# Patient Record
Sex: FEMALE | Race: WHITE
[De-identification: names, ages, dates, MRNs, and addresses within clinical notes are randomized per-mention and may not be internally consistent; named-entity substitution may affect disease eponyms.]

---

## 2017-06-17 ENCOUNTER — HOSPITAL ENCOUNTER (OUTPATIENT)
Dept: HOSPITAL 47 - LABWHC1 | Age: 57
Discharge: HOME | End: 2017-06-17
Attending: INTERNAL MEDICINE
Payer: COMMERCIAL

## 2017-06-17 DIAGNOSIS — Z00.00: Primary | ICD-10-CM

## 2017-06-17 DIAGNOSIS — Z13.220: ICD-10-CM

## 2017-06-17 LAB
ALP SERPL-CCNC: 107 U/L (ref 38–126)
ALT SERPL-CCNC: 32 U/L (ref 9–52)
ANION GAP SERPL CALC-SCNC: 10 MMOL/L
AST SERPL-CCNC: 25 U/L (ref 14–36)
BASOPHILS # BLD AUTO: 0 K/UL (ref 0–0.2)
BASOPHILS NFR BLD AUTO: 1 %
BUN SERPL-SCNC: 19 MG/DL (ref 7–17)
CALCIUM SPEC-MCNC: 9.2 MG/DL (ref 8.4–10.2)
CH: 30
CHCM: 33.6
CHLORIDE SERPL-SCNC: 107 MMOL/L (ref 98–107)
CHOLEST SERPL-MCNC: 209 MG/DL (ref ?–200)
CO2 SERPL-SCNC: 26 MMOL/L (ref 22–30)
EOSINOPHIL # BLD AUTO: 0.1 K/UL (ref 0–0.7)
EOSINOPHIL NFR BLD AUTO: 2 %
ERYTHROCYTE [DISTWIDTH] IN BLOOD BY AUTOMATED COUNT: 4.37 M/UL (ref 3.8–5.4)
ERYTHROCYTE [DISTWIDTH] IN BLOOD: 13.2 % (ref 11.5–15.5)
GLUCOSE SERPL-MCNC: 79 MG/DL (ref 74–99)
HCT VFR BLD AUTO: 39.2 % (ref 34–46)
HDLC SERPL-MCNC: 64 MG/DL (ref 40–60)
HDW: 2.73
HGB BLD-MCNC: 13.2 GM/DL (ref 11.4–16)
LUC NFR BLD AUTO: 2 %
LYMPHOCYTES # SPEC AUTO: 1.5 K/UL (ref 1–4.8)
LYMPHOCYTES NFR SPEC AUTO: 29 %
MCH RBC QN AUTO: 30.2 PG (ref 25–35)
MCHC RBC AUTO-ENTMCNC: 33.5 G/DL (ref 31–37)
MCV RBC AUTO: 89.9 FL (ref 80–100)
MONOCYTES # BLD AUTO: 0.2 K/UL (ref 0–1)
MONOCYTES NFR BLD AUTO: 4 %
NEUTROPHILS # BLD AUTO: 3.4 K/UL (ref 1.3–7.7)
NEUTROPHILS NFR BLD AUTO: 63 %
NON-AFRICAN AMERICAN GFR(MDRD): >60
POTASSIUM SERPL-SCNC: 4.1 MMOL/L (ref 3.5–5.1)
PROT SERPL-MCNC: 7.1 G/DL (ref 6.3–8.2)
SODIUM SERPL-SCNC: 143 MMOL/L (ref 137–145)
TRIGL SERPL-MCNC: 51 MG/DL (ref ?–150)
WBC # BLD AUTO: 0.12 10*3/UL
WBC # BLD AUTO: 5.4 K/UL (ref 3.8–10.6)
WBC (PEROX): 5.53

## 2017-06-17 PROCEDURE — 36415 COLL VENOUS BLD VENIPUNCTURE: CPT

## 2017-06-17 PROCEDURE — 80061 LIPID PANEL: CPT

## 2017-06-17 PROCEDURE — 80053 COMPREHEN METABOLIC PANEL: CPT

## 2017-06-17 PROCEDURE — 85025 COMPLETE CBC W/AUTO DIFF WBC: CPT

## 2017-06-26 ENCOUNTER — HOSPITAL ENCOUNTER (OUTPATIENT)
Dept: HOSPITAL 47 - ORWHC2ENDO | Age: 57
Discharge: HOME | End: 2017-06-26
Payer: COMMERCIAL

## 2017-06-26 VITALS — SYSTOLIC BLOOD PRESSURE: 136 MMHG | DIASTOLIC BLOOD PRESSURE: 75 MMHG | HEART RATE: 70 BPM

## 2017-06-26 VITALS — BODY MASS INDEX: 35.2 KG/M2

## 2017-06-26 VITALS — TEMPERATURE: 98.1 F | RESPIRATION RATE: 16 BRPM

## 2017-06-26 DIAGNOSIS — Z79.1: ICD-10-CM

## 2017-06-26 DIAGNOSIS — Z79.899: ICD-10-CM

## 2017-06-26 DIAGNOSIS — Z88.5: ICD-10-CM

## 2017-06-26 DIAGNOSIS — I10: ICD-10-CM

## 2017-06-26 DIAGNOSIS — Z12.11: Primary | ICD-10-CM

## 2017-06-26 DIAGNOSIS — K21.9: ICD-10-CM

## 2017-06-26 DIAGNOSIS — Z79.82: ICD-10-CM

## 2017-06-26 DIAGNOSIS — Z88.2: ICD-10-CM

## 2017-06-26 DIAGNOSIS — Z91.040: ICD-10-CM

## 2017-06-26 NOTE — P.PCN
Date of Procedure: 06/26/17


Preoperative Diagnosis: 





Postoperative Diagnosis: 





Procedure(s) Performed: 


Procedure: Total colonoscopy.





Preoperative diagnosis: Screening for neoplasia.





Postoperative diagnosis: Exam within normal limits.





Preparation: HalfLytely prep.





Sedation: Was provided by anesthesia.





Brief clinical history: The patient is a 56-year-old female who is referred for 

this evaluation for screening for neoplasia age being her risk factor.  She has 

no abdominal complaints, bleeding or anemia.  Her father had polyps but there 

is no family history of colon cancer.  This would be her first colonoscopy.





Procedure: With the patient on her left lateral decubitus position and after 

informed consent and adequate sedation, the perianal area was inspected and it 

did not show any fissures or fistulas.  There were no masses felt on digital 

rectal examination.  The Olympus CFQ 160L video colonoscope was then inserted 

in the rectum in the usual fashion and advanced to the cecum.  The mucosa 

appeared healthy.  No polyps or tumors were seen or any obvious diverticular 

disease or other pathology.  I retroflexed the endoscope in the rectum before 

the endoscope was withdrawn.





The patient tolerated the procedure well.





Plan: The patient was reassured.  In the absence of family history of colon 

cancer, and not finding any polyps on this exam today, I recommended repeat 

colonoscopy in 10 years.  She will follow-up with you as planned.


Implants: 





Indications for Procedure: 





Operative Findings: 





Description of Procedure:

## 2017-07-03 ENCOUNTER — HOSPITAL ENCOUNTER (OUTPATIENT)
Dept: HOSPITAL 47 - RADMAMWWP | Age: 57
Discharge: HOME | End: 2017-07-03
Attending: INTERNAL MEDICINE
Payer: COMMERCIAL

## 2017-07-03 DIAGNOSIS — Z12.31: Primary | ICD-10-CM

## 2017-07-05 NOTE — MM
Reason for exam: screening  (asymptomatic).

Last mammogram was performed 2 years and 3 months ago.



History:

Patient is postmenopausal.

Took hormonal contraceptives for 3 years 6 months.



Physical Findings:

A clinical breast exam by your physician is recommended on an annual basis and 

results should be correlated with mammographic findings.



MG Screening Mammo w CAD

Bilateral CC and MLO view(s) were taken.

Prior study comparison: April 3, 2015, bilateral MG screening mammo w CAD.

The breast tissue is heterogeneously dense. This may lower the sensitivity of 

mammography.

Finding: There is equal, spiculated, architectural distortion in both breasts, in 

the right breast middle CC view 5cm from the nipple and in the left breast upper 

MLO view 5 cm from the nipple.

Findings are changed since April 3, 2015.





ASSESSMENT: Incomplete: need additional imaging evaluation, BI-RAD 0



RECOMMENDATION:

Special view mammogram of both breasts.



If lesion persists on supplemental views, image directed ultrasound is 

recommended.



[facility] will attempt to contact patient to return for supplemental 

views and ultrasound if indicated.

## 2017-08-21 ENCOUNTER — HOSPITAL ENCOUNTER (OUTPATIENT)
Dept: HOSPITAL 47 - RADMAMWWP | Age: 57
Discharge: HOME | End: 2017-08-21
Attending: INTERNAL MEDICINE
Payer: COMMERCIAL

## 2017-08-21 DIAGNOSIS — R92.8: Primary | ICD-10-CM

## 2017-08-22 NOTE — MM
Reason for exam: additional evaluation requested from abnormal screening.

Last mammogram was performed 2 months ago.



History:

Patient is postmenopausal.

Took hormonal contraceptives for 3 years 6 months.



Physical Findings:

Nurse did not find any significant physical abnormalities on exam.



MG Work Up Mamm w CAD BILAT

Bilateral CC and MLO view(s) were taken.

Prior study comparison: July 3, 2017, bilateral MG screening mammo w CAD.  April 

3, 2015, bilateral MG screening mammo w CAD.

There are scattered fibroglandular densities.

Finding: There are typically benign calcifications in the right breast.



These results were verbally communicated with the patient and result sheet given 

to the patient on 8/21/17.





ASSESSMENT: Probably benign, BI-RAD 3



RECOMMENDATION:

Follow-up diagnostic mammogram of both breasts in 6 months.

## 2018-03-04 ENCOUNTER — HOSPITAL ENCOUNTER (EMERGENCY)
Dept: HOSPITAL 47 - EC | Age: 58
Discharge: HOME | End: 2018-03-04
Payer: COMMERCIAL

## 2018-03-04 VITALS — DIASTOLIC BLOOD PRESSURE: 75 MMHG | RESPIRATION RATE: 18 BRPM | SYSTOLIC BLOOD PRESSURE: 166 MMHG | HEART RATE: 77 BPM

## 2018-03-04 VITALS — TEMPERATURE: 97.4 F

## 2018-03-04 DIAGNOSIS — Z88.2: ICD-10-CM

## 2018-03-04 DIAGNOSIS — Z79.1: ICD-10-CM

## 2018-03-04 DIAGNOSIS — Z53.20: ICD-10-CM

## 2018-03-04 DIAGNOSIS — R10.84: ICD-10-CM

## 2018-03-04 DIAGNOSIS — R11.2: ICD-10-CM

## 2018-03-04 DIAGNOSIS — Z88.5: ICD-10-CM

## 2018-03-04 DIAGNOSIS — Z91.040: ICD-10-CM

## 2018-03-04 DIAGNOSIS — Z79.899: ICD-10-CM

## 2018-03-04 DIAGNOSIS — Z79.82: ICD-10-CM

## 2018-03-04 DIAGNOSIS — K92.1: Primary | ICD-10-CM

## 2018-03-04 LAB
ALBUMIN SERPL-MCNC: 4.1 G/DL (ref 3.5–5)
ALP SERPL-CCNC: 119 U/L (ref 38–126)
ALT SERPL-CCNC: 31 U/L (ref 9–52)
ANION GAP SERPL CALC-SCNC: 8 MMOL/L
AST SERPL-CCNC: 29 U/L (ref 14–36)
BASOPHILS # BLD AUTO: 0 K/UL (ref 0–0.2)
BASOPHILS NFR BLD AUTO: 0 %
BUN SERPL-SCNC: 19 MG/DL (ref 7–17)
CALCIUM SPEC-MCNC: 9.7 MG/DL (ref 8.4–10.2)
CHLORIDE SERPL-SCNC: 106 MMOL/L (ref 98–107)
CK SERPL-CCNC: 156 U/L (ref 30–135)
CO2 SERPL-SCNC: 29 MMOL/L (ref 22–30)
EOSINOPHIL # BLD AUTO: 0.1 K/UL (ref 0–0.7)
EOSINOPHIL NFR BLD AUTO: 1 %
ERYTHROCYTE [DISTWIDTH] IN BLOOD BY AUTOMATED COUNT: 4.67 M/UL (ref 3.8–5.4)
ERYTHROCYTE [DISTWIDTH] IN BLOOD: 13.3 % (ref 11.5–15.5)
GLUCOSE SERPL-MCNC: 95 MG/DL (ref 74–99)
HCT VFR BLD AUTO: 41.4 % (ref 34–46)
HGB BLD-MCNC: 13.6 GM/DL (ref 11.4–16)
LIPASE SERPL-CCNC: 65 U/L (ref 23–300)
LYMPHOCYTES # SPEC AUTO: 1 K/UL (ref 1–4.8)
LYMPHOCYTES NFR SPEC AUTO: 10 %
MCH RBC QN AUTO: 29.1 PG (ref 25–35)
MCHC RBC AUTO-ENTMCNC: 32.8 G/DL (ref 31–37)
MCV RBC AUTO: 88.7 FL (ref 80–100)
MONOCYTES # BLD AUTO: 0.4 K/UL (ref 0–1)
MONOCYTES NFR BLD AUTO: 4 %
NEUTROPHILS # BLD AUTO: 8.3 K/UL (ref 1.3–7.7)
NEUTROPHILS NFR BLD AUTO: 84 %
PLATELET # BLD AUTO: 207 K/UL (ref 150–450)
POTASSIUM SERPL-SCNC: 4.3 MMOL/L (ref 3.5–5.1)
PROT SERPL-MCNC: 7.2 G/DL (ref 6.3–8.2)
SODIUM SERPL-SCNC: 143 MMOL/L (ref 137–145)
TROPONIN I SERPL-MCNC: <0.012 NG/ML (ref 0–0.03)
WBC # BLD AUTO: 9.9 K/UL (ref 3.8–10.6)

## 2018-03-04 PROCEDURE — 74022 RADEX COMPL AQT ABD SERIES: CPT

## 2018-03-04 PROCEDURE — 36415 COLL VENOUS BLD VENIPUNCTURE: CPT

## 2018-03-04 PROCEDURE — 84484 ASSAY OF TROPONIN QUANT: CPT

## 2018-03-04 PROCEDURE — 99285 EMERGENCY DEPT VISIT HI MDM: CPT

## 2018-03-04 PROCEDURE — 96361 HYDRATE IV INFUSION ADD-ON: CPT

## 2018-03-04 PROCEDURE — 82272 OCCULT BLD FECES 1-3 TESTS: CPT

## 2018-03-04 PROCEDURE — 96374 THER/PROPH/DIAG INJ IV PUSH: CPT

## 2018-03-04 PROCEDURE — 83605 ASSAY OF LACTIC ACID: CPT

## 2018-03-04 PROCEDURE — 85025 COMPLETE CBC W/AUTO DIFF WBC: CPT

## 2018-03-04 PROCEDURE — 83690 ASSAY OF LIPASE: CPT

## 2018-03-04 PROCEDURE — 82553 CREATINE MB FRACTION: CPT

## 2018-03-04 PROCEDURE — 80053 COMPREHEN METABOLIC PANEL: CPT

## 2018-03-04 PROCEDURE — 82550 ASSAY OF CK (CPK): CPT

## 2018-03-04 PROCEDURE — 85730 THROMBOPLASTIN TIME PARTIAL: CPT

## 2018-03-04 RX ADMIN — ONDANSETRON STA: 2 INJECTION INTRAMUSCULAR; INTRAVENOUS at 13:58

## 2018-03-04 RX ADMIN — ONDANSETRON STA MG: 2 INJECTION INTRAMUSCULAR; INTRAVENOUS at 13:57

## 2018-03-04 NOTE — ED
GI Bleed HPI





- General


Chief complaint: GI Bleed


Stated complaint: Blood in stool, abd pain


Time Seen by Provider: 03/04/18 13:16


Source: patient


Mode of arrival: ambulatory


Limitations: no limitations





- History of Present Illness


Initial comments: 


57 years O female had nausea vomiting and diarrhea since 12 midnight she ate a 

ate at a Chinese restaurant at supper then she had some strawberries at home 

she been feeling cramps in the abdomen and now she moved her bowels she said 

she lost control and was between around 20 times she went to the urgent care 

they noticed that she had some blood in the stool they referred her here she 

has no history of GI bleeds in the past she has no surgeries on her abdomen she 

still have her appendix still has her gallbladder, she had a colonoscopy done 

in June lost.  It was unremarkable and no obvious history of hemorrhoids and she

's not any blood thinners is complaining about generalized mild pain in the 

abdomen system is unremarkable otherwise








- Related Data


 Home Medications











 Medication  Instructions  Recorded  Confirmed


 


Aspirin 81 mg PO HS 06/21/17 03/04/18


 


Cyclobenzaprine [Flexeril] 10 mg PO HS PRN 06/21/17 03/04/18


 


Lisinopril [Zestril] 2.5 mg PO HS 06/21/17 03/04/18


 


Naproxen Sodium [Aleve] 220 mg PO DAILY 06/21/17 03/04/18


 


Omeprazole [PriLOSEC] 20 mg PO DAILY PRN 06/21/17 03/04/18


 


diphenhydrAMINE [Benadryl] 50 mg PO BID PRN 06/21/17 03/04/18








 Previous Rx's











 Medication  Instructions  Recorded


 


Ciprofloxacin HCl [Cipro] 500 mg PO Q12HR #14 tablet 03/04/18











 Allergies











Allergy/AdvReac Type Severity Reaction Status Date / Time


 


codeine Allergy  elevated BP Verified 03/04/18 14:00


 


Latex, Natural Rubber Allergy  red skin Verified 03/04/18 14:00


 


Sulfa (Sulfonamide Allergy  Rash/Hives Verified 03/04/18 14:00





Antibiotics)     














Review of Systems


ROS Statement: 


Those systems with pertinent positive or pertinent negative responses have been 

documented in the HPI.





ROS Other: All systems not noted in ROS Statement are negative.





Past Medical History


Past Medical History: No Reported History


History of Any Multi-Drug Resistant Organisms: None Reported


Past Surgical History: Orthopedic Surgery


Past Psychological History: No Psychological Hx Reported


Smoking Status: Never smoker


Past Alcohol Use History: None Reported


Past Drug Use History: None Reported





General Exam





- General Exam Comments


Initial Comments: 


General:  The patient is awake and alert, in no distress, and does not appear 

acutely ill. 


Skin:  Skin is warm and dry and no rashes or lesions are noted. 


Eye:  Pupils are equal, round and reactive to light, extra-ocular movements are 

intact; there is normal conjunctiva bilaterally.  


Ears, nose, mouth and throat:  There are moist mucous membranes and no oral 

lesions. 


Neck:  The neck is supple, there is no tenderness  or JVD.  


Cardiovascular:  There is a regular rate and rhythm. No murmur, rub or gallop 

is appreciated.


Respiratory: To auscultation bilateral, no wheezing no rhonchi no distress  

respiratory wise noticed


Gastrointestinal:  Soft, non-distended, non-tender abdomen without masses or 

organomegaly noted. There is no rebound or guarding present. Bowel sounds are 

unremarkable. 


Back:  There is no tenderness to palpation in the midline. There is no obvious 

deformity.


Musculoskeletal:  Normal ROM, no tenderness, There is no pedal edema. There is 

no calf tenderness or swelling. No cords were appreciated.  


Neurological:  CN II-XII intact, Cranial nerves III through XII are intact. 

There are no obvious motor or sensory deficits. Coordination appears grossly 

intact. Speech is normal.


Psychiatric:  Cooperative, appropriate mood & affect, normal judgment.  








Limitations: no limitations





Course


 Vital Signs











  03/04/18 03/04/18





  12:42 15:07


 


Temperature 98.3 F 


 


Pulse Rate 76 77


 


Respiratory 20 18





Rate  


 


Blood Pressure 189/86 166/75


 


O2 Sat by Pulse 100 99





Oximetry  








Labs were reviewed, hemoglobin today is 13.6 back in 2015 it was the same 

ballpark it was 13.6 no significant drop in hemoglobin today anesthesia not 

revealing any significant dehydration considering blood in the stool and will 

cover her for E. coli blood cultures be sent if necessary over and over and 

parasite be done later since she ate at At a buffet where there was a bunch of 

uncooked foods I will put her on the Cipro 500 mg 1 tablet now and now and 

twice a day for next 7 days if she is able to give us a stool sample may be 

definitely sent for stool cultures to evaluate her for E. coli, salmonella, 

Shigella or Campylobacter





Medical Decision Making





- Lab Data


Result diagrams: 


 03/04/18 13:18





 03/04/18 13:18


 Lab Results











  03/04/18 03/04/18 03/04/18 Range/Units





  13:18 13:18 13:18 


 


WBC   9.9   (3.8-10.6)  k/uL


 


RBC   4.67   (3.80-5.40)  m/uL


 


Hgb   13.6   (11.4-16.0)  gm/dL


 


Hct   41.4   (34.0-46.0)  %


 


MCV   88.7   (80.0-100.0)  fL


 


MCH   29.1   (25.0-35.0)  pg


 


MCHC   32.8   (31.0-37.0)  g/dL


 


RDW   13.3   (11.5-15.5)  %


 


Plt Count   207   (150-450)  k/uL


 


Neutrophils %   84   %


 


Lymphocytes %   10   %


 


Monocytes %   4   %


 


Eosinophils %   1   %


 


Basophils %   0   %


 


Neutrophils #   8.3 H   (1.3-7.7)  k/uL


 


Lymphocytes #   1.0   (1.0-4.8)  k/uL


 


Monocytes #   0.4   (0-1.0)  k/uL


 


Eosinophils #   0.1   (0-0.7)  k/uL


 


Basophils #   0.0   (0-0.2)  k/uL


 


APTT     (22.0-30.0)  sec


 


Sodium    143  (137-145)  mmol/L


 


Potassium    4.3  (3.5-5.1)  mmol/L


 


Chloride    106  ()  mmol/L


 


Carbon Dioxide    29  (22-30)  mmol/L


 


Anion Gap    8  mmol/L


 


BUN    19 H  (7-17)  mg/dL


 


Creatinine    0.60  (0.52-1.04)  mg/dL


 


Est GFR (MDRD) Af Amer    >60  (>60 ml/min/1.73 sqM)  


 


Est GFR (MDRD) Non-Af    >60  (>60 ml/min/1.73 sqM)  


 


Glucose    95  (74-99)  mg/dL


 


Plasma Lactic Acid Brendan     (0.7-2.0)  mmol/L


 


Calcium    9.7  (8.4-10.2)  mg/dL


 


Total Bilirubin    0.8  (0.2-1.3)  mg/dL


 


AST    29  (14-36)  U/L


 


ALT    31  (9-52)  U/L


 


Alkaline Phosphatase    119  ()  U/L


 


Total Creatine Kinase  156 H    ()  U/L


 


CK-MB (CK-2)  1.9    (0.0-2.4)  ng/mL


 


CK-MB (CK-2) Rel Index  1.2    


 


Troponin I  <0.012    (0.000-0.034)  ng/mL


 


Total Protein    7.2  (6.3-8.2)  g/dL


 


Albumin    4.1  (3.5-5.0)  g/dL


 


Lipase    65  ()  U/L


 


Stool Occult Blood     (Negative)  














  03/04/18 03/04/18 03/04/18 Range/Units





  13:18 13:55 13:55 


 


WBC     (3.8-10.6)  k/uL


 


RBC     (3.80-5.40)  m/uL


 


Hgb     (11.4-16.0)  gm/dL


 


Hct     (34.0-46.0)  %


 


MCV     (80.0-100.0)  fL


 


MCH     (25.0-35.0)  pg


 


MCHC     (31.0-37.0)  g/dL


 


RDW     (11.5-15.5)  %


 


Plt Count     (150-450)  k/uL


 


Neutrophils %     %


 


Lymphocytes %     %


 


Monocytes %     %


 


Eosinophils %     %


 


Basophils %     %


 


Neutrophils #     (1.3-7.7)  k/uL


 


Lymphocytes #     (1.0-4.8)  k/uL


 


Monocytes #     (0-1.0)  k/uL


 


Eosinophils #     (0-0.7)  k/uL


 


Basophils #     (0-0.2)  k/uL


 


APTT  22.1    (22.0-30.0)  sec


 


Sodium     (137-145)  mmol/L


 


Potassium     (3.5-5.1)  mmol/L


 


Chloride     ()  mmol/L


 


Carbon Dioxide     (22-30)  mmol/L


 


Anion Gap     mmol/L


 


BUN     (7-17)  mg/dL


 


Creatinine     (0.52-1.04)  mg/dL


 


Est GFR (MDRD) Af Amer     (>60 ml/min/1.73 sqM)  


 


Est GFR (MDRD) Non-Af     (>60 ml/min/1.73 sqM)  


 


Glucose     (74-99)  mg/dL


 


Plasma Lactic Acid Brendan   1.1   (0.7-2.0)  mmol/L


 


Calcium     (8.4-10.2)  mg/dL


 


Total Bilirubin     (0.2-1.3)  mg/dL


 


AST     (14-36)  U/L


 


ALT     (9-52)  U/L


 


Alkaline Phosphatase     ()  U/L


 


Total Creatine Kinase     ()  U/L


 


CK-MB (CK-2)     (0.0-2.4)  ng/mL


 


CK-MB (CK-2) Rel Index     


 


Troponin I     (0.000-0.034)  ng/mL


 


Total Protein     (6.3-8.2)  g/dL


 


Albumin     (3.5-5.0)  g/dL


 


Lipase     ()  U/L


 


Stool Occult Blood    Positive H  (Negative)  














Disposition


Clinical Impression: 


 GI bleed, Bloody diarrhea





Disposition: HOME SELF-CARE


Condition: Good


Instructions:  Gastrointestinal Bleeding (ED)


Additional Instructions: 


She is advised to come back to the ER if she develops high fever chills 

abdominal cramps gets severe or bleeding gets severe.  And if symptoms subside 

she be referred to Dr. Ruiz for scope


Prescriptions: 


Ciprofloxacin HCl [Cipro] 500 mg PO Q12HR #14 tablet


Referrals: 


Rommel Lassiter MD [Primary Care Provider] - 1-2 days


Tomi Hernandez MD [STAFF PHYSICIAN] - 1-2 days

## 2018-03-04 NOTE — XR
EXAMINATION TYPE: XR abdomen acute w cxr

 

DATE OF EXAM: 3/4/2018

 

COMPARISON: NONE

 

HISTORY: Diarrhea. Pain.

 

TECHNIQUE:  4 views

 

FINDINGS:  

 

Heart and mediastinum are within normal limits. Lungs are clear. There is no pleural effusion.

 

Bowel gas pattern is normal. There is no sign of intestinal obstruction or pneumoperitoneum. There is
 a plate fixing old fractures of the anterior pelvis. There is a screw fixing the right sacroiliac ashli
int. There is no evidence of a mass. There are no pathologic calcifications over the kidneys. There a
re phleboliths in the pelvis. Fecal pattern appears normal.

IMPRESSION: 

Nonacute abdomen. Normal chest.

## 2018-10-27 ENCOUNTER — HOSPITAL ENCOUNTER (OUTPATIENT)
Dept: HOSPITAL 47 - LABWHC1 | Age: 58
Discharge: HOME | End: 2018-10-27
Attending: NURSE PRACTITIONER
Payer: COMMERCIAL

## 2018-10-27 DIAGNOSIS — Z13.220: ICD-10-CM

## 2018-10-27 DIAGNOSIS — Z00.00: Primary | ICD-10-CM

## 2018-10-27 LAB
ALBUMIN SERPL-MCNC: 4.3 G/DL (ref 3.8–4.9)
ALBUMIN/GLOB SERPL: 1.79 G/DL (ref 1.2–2.1)
ALP SERPL-CCNC: 137 U/L (ref 41–126)
ALT SERPL-CCNC: 22 U/L (ref 8–44)
ANION GAP SERPL CALC-SCNC: 8.6 MMOL/L (ref 4–12)
AST SERPL-CCNC: 25 U/L (ref 13–35)
BASOPHILS # BLD AUTO: 0 K/UL (ref 0–0.2)
BASOPHILS NFR BLD AUTO: 1 %
BUN SERPL-SCNC: 18 MG/DL (ref 9–27)
CALCIUM SPEC-MCNC: 9.5 MG/DL (ref 8.7–10.3)
CHLORIDE SERPL-SCNC: 104 MMOL/L (ref 96–109)
CHOLEST SERPL-MCNC: 194 MG/DL (ref 0–200)
CO2 SERPL-SCNC: 28.4 MMOL/L (ref 21.6–31.8)
EOSINOPHIL # BLD AUTO: 0.1 K/UL (ref 0–0.7)
EOSINOPHIL NFR BLD AUTO: 2 %
ERYTHROCYTE [DISTWIDTH] IN BLOOD BY AUTOMATED COUNT: 4.61 M/UL (ref 3.8–5.4)
ERYTHROCYTE [DISTWIDTH] IN BLOOD: 13.7 % (ref 11.5–15.5)
GLOBULIN SER CALC-MCNC: 2.4 G/DL (ref 2.1–3.7)
GLUCOSE SERPL-MCNC: 75 MG/DL (ref 70–110)
HCT VFR BLD AUTO: 41.7 % (ref 34–46)
HDLC SERPL-MCNC: 58 MG/DL (ref 40–60)
HGB BLD-MCNC: 13.2 GM/DL (ref 11.4–16)
LDLC SERPL CALC-MCNC: 123.2 MG/DL (ref 0–131)
LYMPHOCYTES # SPEC AUTO: 1.7 K/UL (ref 1–4.8)
LYMPHOCYTES NFR SPEC AUTO: 31 %
MCH RBC QN AUTO: 28.7 PG (ref 25–35)
MCHC RBC AUTO-ENTMCNC: 31.8 G/DL (ref 31–37)
MCV RBC AUTO: 90.4 FL (ref 80–100)
MONOCYTES # BLD AUTO: 0.3 K/UL (ref 0–1)
MONOCYTES NFR BLD AUTO: 5 %
NEUTROPHILS # BLD AUTO: 3.3 K/UL (ref 1.3–7.7)
NEUTROPHILS NFR BLD AUTO: 61 %
PLATELET # BLD AUTO: 226 K/UL (ref 150–450)
POTASSIUM SERPL-SCNC: 4.5 MMOL/L (ref 3.5–5.5)
PROT SERPL-MCNC: 6.7 G/DL (ref 6.2–8.2)
SODIUM SERPL-SCNC: 141 MMOL/L (ref 135–145)
TRIGL SERPL-MCNC: 64 MG/DL (ref 0–149)
VLDLC SERPL CALC-MCNC: 12.8 MG/DL (ref 5–40)
WBC # BLD AUTO: 5.5 K/UL (ref 3.8–10.6)

## 2018-10-27 PROCEDURE — 80061 LIPID PANEL: CPT

## 2018-10-27 PROCEDURE — 85025 COMPLETE CBC W/AUTO DIFF WBC: CPT

## 2018-10-27 PROCEDURE — 80053 COMPREHEN METABOLIC PANEL: CPT

## 2018-10-27 PROCEDURE — 36415 COLL VENOUS BLD VENIPUNCTURE: CPT

## 2019-01-16 ENCOUNTER — HOSPITAL ENCOUNTER (EMERGENCY)
Dept: HOSPITAL 47 - EC | Age: 59
Discharge: HOME | End: 2019-01-16
Payer: COMMERCIAL

## 2019-01-16 VITALS — HEART RATE: 80 BPM | SYSTOLIC BLOOD PRESSURE: 131 MMHG | DIASTOLIC BLOOD PRESSURE: 72 MMHG

## 2019-01-16 VITALS — RESPIRATION RATE: 18 BRPM | TEMPERATURE: 98.6 F

## 2019-01-16 DIAGNOSIS — Z79.1: ICD-10-CM

## 2019-01-16 DIAGNOSIS — Z79.82: ICD-10-CM

## 2019-01-16 DIAGNOSIS — S01.01XA: Primary | ICD-10-CM

## 2019-01-16 DIAGNOSIS — Z88.5: ICD-10-CM
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
## 2019-01-16 DIAGNOSIS — Z88.2: ICD-10-CM

## 2019-01-16 DIAGNOSIS — Z91.040: ICD-10-CM

## 2019-01-16 DIAGNOSIS — Z79.899: ICD-10-CM

## 2019-01-16 PROCEDURE — 72125 CT NECK SPINE W/O DYE: CPT

## 2019-01-16 PROCEDURE — 99283 EMERGENCY DEPT VISIT LOW MDM: CPT

## 2019-01-16 PROCEDURE — 12001 RPR S/N/AX/GEN/TRNK 2.5CM/<: CPT

## 2019-01-16 PROCEDURE — 70450 CT HEAD/BRAIN W/O DYE: CPT

## 2019-01-16 NOTE — ED
Head Injury HPI





<Felipe Palmer - Last Filed: 01/16/19 08:50>





- General


Source: patient, RN notes reviewed


Mode of arrival: wheelchair


Limitations: no limitations





- History of Present Illness


MD Complaint: head injury, head pain, fall





<Carson Baker - Last Filed: 01/16/19 09:05>





- General


Chief complaint: Head Injury


Stated complaint: Fall-Head Injury


Time Seen by Provider: 01/16/19 07:31





- History of Present Illness


Initial comments: 





This is a 58-year-old female who states she slipped going on her back door this 

morning about 6 AM she struck the back of her head against a door frame.  She 

complains of headache some visual disturbance also nausea no overt neck pain.  

She states she aches all over however.  No focal weakness.  She did have some 

bleeding from the scalp.  She states her last tetanus shot was 5 years ago.  No 

other modifying factors this time she had no loss of consciousness reported.  

She does however also report some photophobia (Carson Baker)





- Related Data


 Home Medications











 Medication  Instructions  Recorded  Confirmed


 


Aspirin 81 mg PO HS 06/21/17 03/04/18


 


Cyclobenzaprine [Flexeril] 10 mg PO HS PRN 06/21/17 03/04/18


 


Lisinopril [Zestril] 2.5 mg PO HS 06/21/17 03/04/18


 


Naproxen Sodium [Aleve] 220 mg PO DAILY 06/21/17 03/04/18


 


Omeprazole [PriLOSEC] 20 mg PO DAILY PRN 06/21/17 03/04/18


 


diphenhydrAMINE [Benadryl] 50 mg PO BID PRN 06/21/17 03/04/18








 Previous Rx's











 Medication  Instructions  Recorded


 


Ciprofloxacin HCl [Cipro] 500 mg PO Q12HR #14 tablet 03/04/18











Allergies/Adverse reactions: 


 Allergies











Allergy/AdvReac Type Severity Reaction Status Date / Time


 


codeine Allergy  elevated BP Verified 01/16/19 07:51


 


Latex, Natural Rubber Allergy  red skin Verified 01/16/19 07:51


 


Sulfa (Sulfonamide Allergy  Rash/Hives Verified 01/16/19 07:51





Antibiotics)     














Review of Systems


ROS Other: All systems not noted in ROS Statement are negative.





<Felipe Palmer - Last Filed: 01/16/19 08:50>


ROS Other: All systems not noted in ROS Statement are negative.





<Carson Baker - Last Filed: 01/16/19 09:05>


ROS Statement: 


Those systems with pertinent positive or pertinent negative responses have been 

documented in the HPI.








Past Medical History


Past Medical History: No Reported History


History of Any Multi-Drug Resistant Organisms: None Reported


Past Surgical History: Orthopedic Surgery


Past Psychological History: No Psychological Hx Reported


Smoking Status: Never smoker


Past Alcohol Use History: None Reported


Past Drug Use History: None Reported





<Carson Baker - Last Filed: 01/16/19 09:05>





General Exam





<Felipe Palmer - Last Filed: 01/16/19 08:50>


Limitations: no limitations


General appearance: alert, anxious


Head exam: Present: normocephalic, other (Approximately 2.5 cm laceration to 

the mid upper occipital scalp no step-off or crepitation no current active 

bleeding.  No foreign body seen.)


Eye exam: Present: normal appearance, PERRL, EOMI.  Absent: scleral icterus, 

conjunctival injection, periorbital swelling


ENT exam: Present: normal exam, mucous membranes moist


Neck exam: Present: normal inspection.  Absent: tenderness, meningismus, 

lymphadenopathy


Respiratory exam: Present: normal lung sounds bilaterally.  Absent: respiratory 

distress, wheezes, rales, rhonchi, stridor


Cardiovascular Exam: Present: regular rate, normal rhythm, normal heart sounds.

  Absent: systolic murmur, diastolic murmur, rubs, gallop, clicks


GI/Abdominal exam: Present: soft, normal bowel sounds.  Absent: distended, 

tenderness, guarding, rebound, rigid


Extremities exam: Present: normal inspection, full ROM, normal capillary 

refill.  Absent: tenderness, pedal edema, joint swelling, calf tenderness


Back exam: Present: normal inspection


Neurological exam: Present: alert, oriented X3, CN II-XII intact


Psychiatric exam: Present: normal affect, normal mood


Skin exam: Present: warm, dry, intact, normal color.  Absent: rash





<Carson Baker - Last Filed: 01/16/19 09:05>





- General Exam Comments


Initial Comments: 





This is a well-developed well-nourished awake alert oriented 4 female [place] 

Coma Scale of 15 (Carson Baker)





Course





<Felipe Palmer - Last Filed: 01/16/19 08:50>





<Carson Baker - Last Filed: 01/16/19 09:05>


 Vital Signs











  01/16/19





  07:24


 


Temperature 98.6 F


 


Pulse Rate 79


 


Respiratory 18





Rate 


 


Blood Pressure 150/86


 


O2 Sat by Pulse 96





Oximetry 














- Reevaluation(s)


Reevaluation #1: 





01/16/19 09:01


The patient's scalp laceration was repaired by physician DENICE Wang (Carson Baker)





Medical Decision Making





<Felipe Palmer - Last Filed: 01/16/19 08:50>





- Radiology Data


Radiology results: report reviewed (Did review the imaging and reports no 

evidence of any fractures or head maladies except the scalp hematoma.), image 

reviewed





<Carson Baker - Last Filed: 01/16/19 09:05>





- Medical Decision Making





2 cm linear laceration to the baclk of the head. The skin was anesthetized with 

1% lidocaine without epinephrine. The laceration was then cleansed with 

Betadine and irrigated with normal saline. The wound was inspected, and there 

was no evidence of injury to deep structures. No foreign body was noted in the 

wound. A total of 3 skin staples were placed with good approximation.


 (Felipe Palmer)





I did discuss findings with the patient she'll be discharged she does present 

with a scalp laceration scalp hematoma from a fall.  Imaging was negative for 

fractures dislocations or intracranial abnormalities.  Patient's tetanus shots 

are up-to-date. (Carson Baker)





Disposition





<Felipe Palmer - Last Filed: 01/16/19 08:50>


Is patient prescribed a controlled substance at d/c from ED?: No





<Carson Baker - Last Filed: 01/16/19 09:05>


Clinical Impression: 


 Contusion of scalp, Hematoma of scalp, Laceration of scalp, Fall





Disposition: HOME SELF-CARE


Condition: Good


Instructions:  Scalp Contusion in Adults (ED), Laceration (ED), Hematoma (ED)


Additional Instructions: 


Tylenol for pain





Referrals: 


Rommel Lassiter MD [Primary Care Provider] - 1-2 days

## 2019-01-16 NOTE — CT
EXAMINATION TYPE: CT brain janet sheth con

 

DATE OF EXAM: 1/16/2019

 

COMPARISON: CT brain from 2009. MRI brain 2012.

 

HISTORY: Slip and fall injury hitting back of head with headache and neck pain

 

CT DLP: 1486.2 mGycm. Automated Exposure Control for Dose Reduction was Utilized.

 

 

TECHNIQUE: CT scan of the head and cervical spine are performed without contrast.

 

FINDINGS:   There is no acute intracranial hemorrhage, mass effect, or midline shift identified.  The
 ventricles and sulci are within normal limits in size. Mild areas of hypodensity throughout the whit
e matter correlate with 2012 MRI presumed product of chronic small vessel ischemic change. The globes
 are intact and the visualized sinuses are clear. There is moderate size right parietal acute scalp h
ematoma centered near axial image 24. Adjacent calvarium is intact. Persistent anterior metopic sutur
e incidentally noted. Some soft tissue density left extra auditory canal consistent with cerumen is p
resent. Nasal septum remains deviated to right of midline.

 

Cervical spine is visualized in its entirety from C1 through upper thoracic levels and demonstrates s
traightened alignment without evidence of acute fracture or dislocation.  Prevertebral soft tissue ap
pears within normal limits.  The C1-C2 articulation is within normal limits on the coronal images.  V
ertebral body heights are maintained. There is mild to moderate disc space narrowing C4-C5 through C6
-C7 levels with mild multilevel spurring. No large posterior disc herniations are seen on sagittal im
ages. 

 

There is posterior disc herniation effacing anterior thecal sac at C4-C5 level with uncovertebral david
nt spurring causing mild to moderate right greater than left bilateral neural foraminal narrowing on 
axial images. 

 

There is right paracentral/foraminal disc protrusion effacing anterolateral thecal sac at C6-C7 level
 and causing asymmetric moderate to severe right-sided neural foraminal narrowing axial image 69.

 

Thyroid gland is felt within normal limits. Visualized lung apices show mild groundglass opacity sugg
esting mild alveolar edema and/or atelectatic change.

 

IMPRESSION:

1. There is no acute fracture or dislocation evident in the cervical spine.

2. No acute intracranial hemorrhage or midline shift is seen. Moderate size acute right parietal scal
p hematoma noted.

## 2020-09-22 ENCOUNTER — HOSPITAL ENCOUNTER (EMERGENCY)
Dept: HOSPITAL 47 - EC | Age: 60
Discharge: HOME | End: 2020-09-22
Payer: COMMERCIAL

## 2020-09-22 VITALS — DIASTOLIC BLOOD PRESSURE: 86 MMHG | SYSTOLIC BLOOD PRESSURE: 150 MMHG | HEART RATE: 63 BPM | RESPIRATION RATE: 16 BRPM

## 2020-09-22 VITALS — TEMPERATURE: 98.4 F

## 2020-09-22 DIAGNOSIS — I10: ICD-10-CM

## 2020-09-22 DIAGNOSIS — Z91.040: ICD-10-CM

## 2020-09-22 DIAGNOSIS — Z88.5: ICD-10-CM

## 2020-09-22 DIAGNOSIS — G51.0: Primary | ICD-10-CM

## 2020-09-22 DIAGNOSIS — Z79.82: ICD-10-CM

## 2020-09-22 DIAGNOSIS — Z79.899: ICD-10-CM

## 2020-09-22 DIAGNOSIS — Z88.2: ICD-10-CM

## 2020-09-22 PROCEDURE — 99284 EMERGENCY DEPT VISIT MOD MDM: CPT

## 2020-09-22 PROCEDURE — 70450 CT HEAD/BRAIN W/O DYE: CPT

## 2020-09-22 NOTE — CT
EXAMINATION TYPE: CT brain wo con

 

DATE OF EXAM: 9/22/2020

 

COMPARISON: Prior CT brain 1/16/2019

 

HISTORY: issues with rt eye / rt facial drooping since yesterday. per pt rt facial paralysis Fercho marcus
lsy

 

CT DLP: 1017.4 mGycm

Automated exposure control for dose reduction was used. Helical imaging through the brain.

 

FINDINGS: 

Exam is stable. Wedge-shaped area of decreased attenuation in the posterior occipital region on the l
eft is stable. There is no hemorrhage or hydrocephalus.

 

IMPRESSION: 

NO ACUTE ABNORMALITY. STABLE EXAM.

## 2020-09-22 NOTE — ED
General Adult HPI





- General


Chief complaint: Neuro Symptoms/Deficit


Stated complaint: paralysis right side


Time Seen by Provider: 20 14:00


Source: patient, RN notes reviewed, old records reviewed


Mode of arrival: ambulatory


Limitations: no limitations





- History of Present Illness


Initial comments: 





This is a 60-year-old female who presents to the emergency department 

complaining of right-sided facial droop.  Patient states it started yesterday at

3 PM.  Patient states the symptoms got worse overnight and now her right I can 

close in her eyes, slight tearing.  Patient denies being able to move her 

forehead on the right side.  Patient denies any recent fever chills per patient 

denies any difficulty breathing shortness of breath or chest pain.  Patient 

denies any other weakness or numbness of any extremity pain.  Patient denies any

slurred speech.





- Related Data


                                Home Medications











 Medication  Instructions  Recorded  Confirmed


 


Aspirin 81 mg PO HS 17


 


Cyclobenzaprine [Flexeril] 10 mg PO HS PRN 17


 


Naproxen Sodium [Aleve] 220 mg PO DAILY 17


 


Omeprazole [PriLOSEC] 20 mg PO DAILY PRN 17


 


diphenhydrAMINE [Benadryl] 50 mg PO BID PRN 17


 


lisinopriL [Zestril] 2.5 mg PO HS 17








                                  Previous Rx's











 Medication  Instructions  Recorded


 


Ciprofloxacin HCl [Cipro] 500 mg PO Q12HR #14 tablet 18


 


predniSONE 30 mg PO BID 10 Days #60 tab 20


 


valACYclovir HCL 1,000 mg PO TID 10 Days #30 tab 20











                                    Allergies











Allergy/AdvReac Type Severity Reaction Status Date / Time


 


codeine Allergy  elevated BP Verified 20 14:04


 


Latex, Natural Rubber Allergy  red skin Verified 20 14:04


 


Sulfa (Sulfonamide Allergy  Rash/Hives Verified 20 14:04





Antibiotics)     














Review of Systems


ROS Statement: 


Those systems with pertinent positive or pertinent negative responses have been 

documented in the HPI.





ROS Other: All systems not noted in ROS Statement are negative.





Past Medical History


Past Medical History: Hypertension


Additional Past Medical History / Comment(s): heart murmur


History of Any Multi-Drug Resistant Organisms: None Reported


Past Surgical History:  Section, Orthopedic Surgery


Past Psychological History: No Psychological Hx Reported


Smoking Status: Never smoker


Past Alcohol Use History: None Reported


Past Drug Use History: None Reported





General Exam





- General Exam Comments


Initial Comments: 





GENERAL:


Patient is well-developed and well-nourished.  Patient is nontoxic and well-

hydrated and is in no acute distress.





ENT:


Neck is soft and supple.  No significant lymphadenopathy is noted.  Oropharynx 

is clear.  Moist mucous membranes.  Neck has full range of motion without 

eliciting any pain. 





EYES:


The sclera were anicteric and conjunctiva were pink and moist.  Extraocular 

movements were intact and pupils were equal round and reactive to light.  

Eyelids were unremarkable.





PULMONARY:


Unlabored respirations.  Good breath sounds bilaterally.  No audible rales 

rhonchi or wheezing was noted.





CARDIOVASCULAR:


There is a regular rate and rhythm without any murmurs gallops or rubs. 





ABDOMEN:


Soft and nontender with normal bowel sounds.  No palpable organomegaly was 

noted.  There is no palpable pulsatile mass.





SKIN:


Skin is clear with no lesions or rashes and otherwise unremarkable.





NEUROLOGIC:


Patient is alert and oriented x3.  Patient has complete right-sided facial 

weakness.  She has no ability to move the right side of her face her eyelid or 

her forehead.  Motor and sensory of extremities are intact.  Normal speech, 

volume and content.  Symmetrical smile.





MUSCULOSKELETAL:


Normal extremities with adequate strength and full range of motion. 





LYMPHATICS:


No significant lymphadenopathy is noted





PSYCHIATRIC:


Normal psychiatric evaluation.


Limitations: no limitations





Course





                                   Vital Signs











  20





  13:59


 


Temperature 98.4 F


 


Pulse Rate 69


 


Respiratory 18





Rate 


 


Blood Pressure 168/96


 


O2 Sat by Pulse 99





Oximetry 














Medical Decision Making





- Medical Decision Making





CT of the brain is negative





Disposition


Clinical Impression: 


 Bell's palsy





Disposition: HOME SELF-CARE


Condition: Good


Prescriptions: 


predniSONE 30 mg PO BID 10 Days #60 tab


valACYclovir HCL 1,000 mg PO TID 10 Days #30 tab


Is patient prescribed a controlled substance at d/c from ED?: No


Referrals: 


Rommel Lassiter MD [Primary Care Provider] - 1-2 days


Time of Disposition: 14:21

## 2020-11-10 ENCOUNTER — HOSPITAL ENCOUNTER (OUTPATIENT)
Dept: HOSPITAL 47 - RADMAMWWP | Age: 60
Discharge: HOME | End: 2020-11-10
Payer: COMMERCIAL

## 2020-11-10 DIAGNOSIS — Z00.01: ICD-10-CM

## 2020-11-10 DIAGNOSIS — Z12.31: Primary | ICD-10-CM

## 2020-11-10 DIAGNOSIS — Z13.220: ICD-10-CM

## 2020-11-10 LAB
ALBUMIN SERPL-MCNC: 3.9 G/DL (ref 3.5–5)
ALP SERPL-CCNC: 118 U/L (ref 38–126)
ALT SERPL-CCNC: 30 U/L (ref 4–34)
ANION GAP SERPL CALC-SCNC: 2 MMOL/L
AST SERPL-CCNC: 30 U/L (ref 14–36)
BASOPHILS # BLD AUTO: 0 K/UL (ref 0–0.2)
BASOPHILS NFR BLD AUTO: 1 %
BUN SERPL-SCNC: 20 MG/DL (ref 7–17)
CALCIUM SPEC-MCNC: 9.4 MG/DL (ref 8.4–10.2)
CHLORIDE SERPL-SCNC: 106 MMOL/L (ref 98–107)
CHOLEST SERPL-MCNC: 217 MG/DL (ref ?–200)
CO2 SERPL-SCNC: 30 MMOL/L (ref 22–30)
EOSINOPHIL # BLD AUTO: 0.1 K/UL (ref 0–0.7)
EOSINOPHIL NFR BLD AUTO: 3 %
ERYTHROCYTE [DISTWIDTH] IN BLOOD BY AUTOMATED COUNT: 4.43 M/UL (ref 3.8–5.4)
ERYTHROCYTE [DISTWIDTH] IN BLOOD: 13.8 % (ref 11.5–15.5)
GLUCOSE SERPL-MCNC: 94 MG/DL (ref 74–99)
HCT VFR BLD AUTO: 41.1 % (ref 34–46)
HDLC SERPL-MCNC: 64 MG/DL (ref 40–60)
HGB BLD-MCNC: 13.1 GM/DL (ref 11.4–16)
LDLC SERPL CALC-MCNC: 141 MG/DL (ref 0–99)
LYMPHOCYTES # SPEC AUTO: 1.6 K/UL (ref 1–4.8)
LYMPHOCYTES NFR SPEC AUTO: 30 %
MCH RBC QN AUTO: 29.5 PG (ref 25–35)
MCHC RBC AUTO-ENTMCNC: 31.8 G/DL (ref 31–37)
MCV RBC AUTO: 92.8 FL (ref 80–100)
MONOCYTES # BLD AUTO: 0.2 K/UL (ref 0–1)
MONOCYTES NFR BLD AUTO: 5 %
NEUTROPHILS # BLD AUTO: 3.2 K/UL (ref 1.3–7.7)
NEUTROPHILS NFR BLD AUTO: 60 %
PLATELET # BLD AUTO: 203 K/UL (ref 150–450)
POTASSIUM SERPL-SCNC: 4.6 MMOL/L (ref 3.5–5.1)
PROT SERPL-MCNC: 6.8 G/DL (ref 6.3–8.2)
SODIUM SERPL-SCNC: 138 MMOL/L (ref 137–145)
TRIGL SERPL-MCNC: 61 MG/DL (ref ?–150)
WBC # BLD AUTO: 5.4 K/UL (ref 3.8–10.6)

## 2020-11-10 PROCEDURE — 77063 BREAST TOMOSYNTHESIS BI: CPT

## 2020-11-10 PROCEDURE — 85025 COMPLETE CBC W/AUTO DIFF WBC: CPT

## 2020-11-10 PROCEDURE — 80053 COMPREHEN METABOLIC PANEL: CPT

## 2020-11-10 PROCEDURE — 77067 SCR MAMMO BI INCL CAD: CPT

## 2020-11-10 PROCEDURE — 80061 LIPID PANEL: CPT

## 2020-11-11 NOTE — MM
Reason for exam: screening  (asymptomatic).

Last mammogram was performed 3 years and 3 months ago.



History:

Patient is postmenopausal.

Took hormonal contraceptives for 3 years 6 months.



Physical Findings:

A clinical breast exam by your physician is recommended on an annual basis and 

results should be correlated with mammographic findings.



MG 3D Screening Mammo W/Cad

Bilateral CC and MLO view(s) were taken.

Prior study comparison: August 21, 2017, bilateral MG work up mamm w CAD BILAT.  

July 3, 2017, bilateral MG screening mammo w CAD.

There are scattered fibroglandular densities.  There is no discrete abnormality.  

No significant changes when compared with prior studies.





ASSESSMENT: Negative, BI-RAD 1



RECOMMENDATION:

Routine screening mammogram of both breasts in 1 year.

Manage patient on a clinical basis.

## 2021-03-16 ENCOUNTER — HOSPITAL ENCOUNTER (EMERGENCY)
Dept: HOSPITAL 47 - EC | Age: 61
Discharge: HOME | End: 2021-03-16
Payer: COMMERCIAL

## 2021-03-16 VITALS
DIASTOLIC BLOOD PRESSURE: 97 MMHG | SYSTOLIC BLOOD PRESSURE: 162 MMHG | HEART RATE: 76 BPM | TEMPERATURE: 97.7 F | RESPIRATION RATE: 18 BRPM

## 2021-03-16 DIAGNOSIS — N39.0: Primary | ICD-10-CM

## 2021-03-16 DIAGNOSIS — I10: ICD-10-CM

## 2021-03-16 LAB
PH UR: 5.5 [PH] (ref 5–8)
RBC UR QL: 23 /HPF (ref 0–5)
SP GR UR: 1.01 (ref 1–1.03)
SQUAMOUS UR QL AUTO: 4 /HPF (ref 0–4)
UROBILINOGEN UR QL STRIP: <2 MG/DL (ref ?–2)
WBC # UR AUTO: 75 /HPF (ref 0–5)

## 2021-03-16 PROCEDURE — 87077 CULTURE AEROBIC IDENTIFY: CPT

## 2021-03-16 PROCEDURE — 87086 URINE CULTURE/COLONY COUNT: CPT

## 2021-03-16 PROCEDURE — 81001 URINALYSIS AUTO W/SCOPE: CPT

## 2021-03-16 PROCEDURE — 99283 EMERGENCY DEPT VISIT LOW MDM: CPT

## 2021-03-16 PROCEDURE — 87186 SC STD MICRODIL/AGAR DIL: CPT

## 2021-03-16 NOTE — ED
General Adult HPI





- General


Chief complaint: Urogenital


Stated complaint: Female UG


Time Seen by Provider: 21 06:57


Source: patient, RN notes reviewed


Mode of arrival: ambulatory


Limitations: no limitations





- History of Present Illness


Initial comments: 


60-year-old female presents emergency Department with chief complaint of 

dysuria.  Patient states she woke up and intense burning after urination.  

Patient states that she does get some irritation knee some creams.  Patient 

states is not helping with states that she took Tylenol and symptoms are 

improving.  No fevers or chills no flank pain denies any nausea vomiting 

diarrhea constipation no chest pain.








- Related Data


                                Home Medications











 Medication  Instructions  Recorded  Confirmed


 


Cyclobenzaprine [Flexeril] 10 mg PO HS PRN 17


 


Omeprazole [PriLOSEC] 20 mg PO DAILY PRN 17


 


lisinopriL [Zestril] 2.5 mg PO HS 17


 


Acetaminophen Tab [Tylenol Tab] 1,000 mg PO Q6H PRN 21


 


Ibuprofen [Motrin Ib] 200 mg PO Q8H PRN 21








                                  Previous Rx's











 Medication  Instructions  Recorded


 


Cephalexin [Keflex] 500 mg PO Q8HR #21 cap 21


 


Phenazopyridine [Pyridium] 200 mg PO TID #6 tablet 21











                                    Allergies











Allergy/AdvReac Type Severity Reaction Status Date / Time


 


Latex, Natural Rubber Allergy  red skin Verified 21 07:43


 


Sulfa (Sulfonamide Allergy  Rash/Hives Verified 21 07:43





Antibiotics)     


 


codeine AdvReac  elevated Verified 21 07:43





   BP/Hallucinations  














Review of Systems


ROS Statement: 


Those systems with pertinent positive or pertinent negative responses have been 

documented in the HPI.





ROS Other: All systems not noted in ROS Statement are negative.





Past Medical History


Past Medical History: Hypertension


Additional Past Medical History / Comment(s): heart murmur


History of Any Multi-Drug Resistant Organisms: None Reported


Past Surgical History:  Section, Orthopedic Surgery


Past Psychological History: No Psychological Hx Reported


Smoking Status: Never smoker


Past Alcohol Use History: None Reported


Past Drug Use History: None Reported





General Exam


Limitations: no limitations


General appearance: alert, in no apparent distress


Head exam: Present: atraumatic, normocephalic, normal inspection


Eye exam: Present: normal appearance, PERRL, EOMI.  Absent: scleral icterus, 

conjunctival injection, periorbital swelling


ENT exam: Present: normal exam, normal oropharynx, mucous membranes moist


Neck exam: Present: normal inspection, full ROM.  Absent: tenderness, 

meningismus, lymphadenopathy


Respiratory exam: Present: normal lung sounds bilaterally.  Absent: respiratory 

distress, wheezes, rales, rhonchi, stridor


Cardiovascular Exam: Present: regular rate, normal rhythm, normal heart sounds. 

 Absent: systolic murmur, diastolic murmur, rubs, gallop, clicks


GI/Abdominal exam: Present: soft, normal bowel sounds.  Absent: distended, 

tenderness, guarding, rebound, rigid


Back exam: Absent: CVA tenderness (R), CVA tenderness (L)





Course


                                   Vital Signs











  21





  06:51


 


Temperature 97.7 F


 


Pulse Rate 76


 


Respiratory 18





Rate 


 


Blood Pressure 162/97


 


O2 Sat by Pulse 98





Oximetry 














Medical Decision Making





- Medical Decision Making





Urinalysis reveals evidence of urinary tract infection.  Patient is afebrile, no

 flank pain.  Patient we discharged with oral antibiotics, Pyridium.  Return 

parameters discussed with his discuss increasing fluid intake.





- Lab Data


                                   Lab Results











  21 Range/Units





  07:00 


 


Urine Color  Yellow  


 


Urine Appearance  Cloudy H  (Clear)  


 


Urine pH  5.5  (5.0-8.0)  


 


Ur Specific Gravity  1.008  (1.001-1.035)  


 


Urine Protein  Negative  (Negative)  


 


Urine Glucose (UA)  Negative  (Negative)  


 


Urine Ketones  Negative  (Negative)  


 


Urine Blood  Large H  (Negative)  


 


Urine Nitrite  Negative  (Negative)  


 


Urine Bilirubin  Negative  (Negative)  


 


Urine Urobilinogen  <2.0  (<2.0)  mg/dL


 


Ur Leukocyte Esterase  Large H  (Negative)  


 


Urine RBC  23 H  (0-5)  /hpf


 


Urine WBC  75 H  (0-5)  /hpf


 


Ur Squamous Epith Cells  4  (0-4)  /hpf


 


Urine Bacteria  Rare H  (None)  /hpf


 


Urine Mucus  Rare H  (None)  /hpf














Disposition


Clinical Impression: 


 Urinary tract infection





Disposition: HOME SELF-CARE


Condition: Stable


Instructions (If sedation given, give patient instructions):  Urinary Tract 

Infection in Women (ED)


Additional Instructions: 


Please return to the Emergency Department if symptoms worsen or any other 

concerns.


Prescriptions: 


Cephalexin [Keflex] 500 mg PO Q8HR #21 cap


Phenazopyridine [Pyridium] 200 mg PO TID #6 tablet


Is patient prescribed a controlled substance at d/c from ED?: No


Referrals: 


Rommel Lassiter MD [Primary Care Provider] - 1-2 days


Time of Disposition: 08:23

## 2022-07-26 ENCOUNTER — HOSPITAL ENCOUNTER (OUTPATIENT)
Dept: HOSPITAL 47 - LABWHC1 | Age: 62
Discharge: HOME | End: 2022-07-26
Attending: INTERNAL MEDICINE
Payer: COMMERCIAL

## 2022-07-26 DIAGNOSIS — Z13.220: ICD-10-CM

## 2022-07-26 DIAGNOSIS — Z00.01: Primary | ICD-10-CM

## 2022-07-26 LAB
ALBUMIN SERPL-MCNC: 4.2 G/DL (ref 3.8–4.9)
ALBUMIN/GLOB SERPL: 1.42 G/DL (ref 1.6–3.17)
ALP SERPL-CCNC: 108 U/L (ref 41–126)
ALT SERPL-CCNC: 22 U/L (ref 8–44)
ANION GAP SERPL CALC-SCNC: 10.8 MMOL/L (ref 10–18)
AST SERPL-CCNC: 25 U/L (ref 13–35)
BASOPHILS # BLD AUTO: 0.02 X 10*3/UL (ref 0–0.1)
BASOPHILS NFR BLD AUTO: 0.4 %
BUN SERPL-SCNC: 19.8 MG/DL (ref 9–27)
BUN/CREAT SERPL: 24.38 RATIO (ref 12–20)
CALCIUM SPEC-MCNC: 9.9 MG/DL (ref 8.7–10.3)
CHLORIDE SERPL-SCNC: 106 MMOL/L (ref 96–109)
CHOLEST SERPL-MCNC: 204 MG/DL (ref 0–200)
CO2 SERPL-SCNC: 25.9 MMOL/L (ref 20–27.5)
EOSINOPHIL # BLD AUTO: 0.15 X 10*3/UL (ref 0.04–0.35)
EOSINOPHIL NFR BLD AUTO: 3.2 %
ERYTHROCYTE [DISTWIDTH] IN BLOOD BY AUTOMATED COUNT: 4.42 X 10*6/UL (ref 4.1–5.2)
ERYTHROCYTE [DISTWIDTH] IN BLOOD: 13.8 % (ref 11.5–14.5)
GLOBULIN SER CALC-MCNC: 3 G/DL (ref 1.6–3.3)
GLUCOSE SERPL-MCNC: 88 MG/DL (ref 70–110)
HCT VFR BLD AUTO: 40.5 % (ref 37.2–46.3)
HDLC SERPL-MCNC: 55.2 MG/DL (ref 40–60)
HGB BLD-MCNC: 12.8 G/DL (ref 12–15)
IMM GRANULOCYTES BLD QL AUTO: 0.2 %
LDLC SERPL CALC-MCNC: 134.8 MG/DL (ref 0–131)
LYMPHOCYTES # SPEC AUTO: 1.54 X 10*3/UL (ref 0.9–5)
LYMPHOCYTES NFR SPEC AUTO: 32.4 %
MCH RBC QN AUTO: 29 PG (ref 27–32)
MCHC RBC AUTO-ENTMCNC: 31.6 G/DL (ref 32–37)
MCV RBC AUTO: 91.6 FL (ref 80–97)
MONOCYTES # BLD AUTO: 0.29 X 10*3/UL (ref 0.2–1)
MONOCYTES NFR BLD AUTO: 6.1 %
NEUTROPHILS # BLD AUTO: 2.75 X 10*3/UL (ref 1.8–7.7)
NEUTROPHILS NFR BLD AUTO: 57.7 %
NRBC BLD AUTO-RTO: 0 /100 WBCS (ref 0–0)
PLATELET # BLD AUTO: 194 X 10*3/UL (ref 140–440)
POTASSIUM SERPL-SCNC: 3.9 MMOL/L (ref 3.5–5.5)
PROT SERPL-MCNC: 7.1 G/DL (ref 6.2–8.2)
SODIUM SERPL-SCNC: 142 MMOL/L (ref 135–145)
TRIGL SERPL-MCNC: 70 MG/DL (ref 0–149)
VLDLC SERPL CALC-MCNC: 14 MG/DL (ref 5–40)
WBC # BLD AUTO: 4.76 X 10*3/UL (ref 4.5–10)

## 2022-07-26 PROCEDURE — 80061 LIPID PANEL: CPT

## 2022-07-26 PROCEDURE — 36415 COLL VENOUS BLD VENIPUNCTURE: CPT

## 2022-07-26 PROCEDURE — 85025 COMPLETE CBC W/AUTO DIFF WBC: CPT

## 2022-07-26 PROCEDURE — 80053 COMPREHEN METABOLIC PANEL: CPT
